# Patient Record
Sex: FEMALE | Race: BLACK OR AFRICAN AMERICAN | NOT HISPANIC OR LATINO | Employment: OTHER | URBAN - METROPOLITAN AREA
[De-identification: names, ages, dates, MRNs, and addresses within clinical notes are randomized per-mention and may not be internally consistent; named-entity substitution may affect disease eponyms.]

---

## 2024-06-25 ENCOUNTER — HOSPITAL ENCOUNTER (EMERGENCY)
Facility: HOSPITAL | Age: 84
Discharge: HOME | End: 2024-06-26
Attending: EMERGENCY MEDICINE
Payer: MEDICARE

## 2024-06-25 ENCOUNTER — APPOINTMENT (OUTPATIENT)
Dept: RADIOLOGY | Facility: HOSPITAL | Age: 84
End: 2024-06-25
Payer: MEDICARE

## 2024-06-25 DIAGNOSIS — M79.10 MUSCLE PAIN: Primary | ICD-10-CM

## 2024-06-25 PROCEDURE — 93971 EXTREMITY STUDY: CPT | Performed by: RADIOLOGY

## 2024-06-25 PROCEDURE — 99284 EMERGENCY DEPT VISIT MOD MDM: CPT | Mod: 25

## 2024-06-25 PROCEDURE — 93971 EXTREMITY STUDY: CPT

## 2024-06-25 PROCEDURE — 2500000005 HC RX 250 GENERAL PHARMACY W/O HCPCS: Mod: SE | Performed by: STUDENT IN AN ORGANIZED HEALTH CARE EDUCATION/TRAINING PROGRAM

## 2024-06-25 RX ORDER — LIDOCAINE 560 MG/1
1 PATCH PERCUTANEOUS; TOPICAL; TRANSDERMAL ONCE
Status: DISCONTINUED | OUTPATIENT
Start: 2024-06-25 | End: 2024-06-26 | Stop reason: HOSPADM

## 2024-06-25 ASSESSMENT — PAIN - FUNCTIONAL ASSESSMENT
PAIN_FUNCTIONAL_ASSESSMENT: 0-10

## 2024-06-25 ASSESSMENT — LIFESTYLE VARIABLES
HAVE YOU EVER FELT YOU SHOULD CUT DOWN ON YOUR DRINKING: NO
EVER FELT BAD OR GUILTY ABOUT YOUR DRINKING: NO
HAVE PEOPLE ANNOYED YOU BY CRITICIZING YOUR DRINKING: NO
EVER HAD A DRINK FIRST THING IN THE MORNING TO STEADY YOUR NERVES TO GET RID OF A HANGOVER: NO
TOTAL SCORE: 0

## 2024-06-25 ASSESSMENT — PAIN DESCRIPTION - LOCATION
LOCATION: LEG
LOCATION: HEAD

## 2024-06-25 ASSESSMENT — PAIN SCALES - GENERAL
PAINLEVEL_OUTOF10: 7
PAINLEVEL_OUTOF10: 8
PAINLEVEL_OUTOF10: 9
PAINLEVEL_OUTOF10: 7

## 2024-06-25 ASSESSMENT — COLUMBIA-SUICIDE SEVERITY RATING SCALE - C-SSRS
2. HAVE YOU ACTUALLY HAD ANY THOUGHTS OF KILLING YOURSELF?: NO
1. IN THE PAST MONTH, HAVE YOU WISHED YOU WERE DEAD OR WISHED YOU COULD GO TO SLEEP AND NOT WAKE UP?: NO
6. HAVE YOU EVER DONE ANYTHING, STARTED TO DO ANYTHING, OR PREPARED TO DO ANYTHING TO END YOUR LIFE?: NO

## 2024-06-25 ASSESSMENT — PAIN DESCRIPTION - DESCRIPTORS: DESCRIPTORS: ACHING

## 2024-06-25 ASSESSMENT — PAIN DESCRIPTION - PAIN TYPE: TYPE: ACUTE PAIN

## 2024-06-25 NOTE — ED TRIAGE NOTES
Pt c/o fall 2 weeks ago (tripped and fell) onto concrete onto her L side (did not seek medical attention). Endorses upper thigh pain that has gotten progressively worse. -LOC, -Thinners.

## 2024-06-26 ENCOUNTER — APPOINTMENT (OUTPATIENT)
Dept: RADIOLOGY | Facility: HOSPITAL | Age: 84
End: 2024-06-26
Payer: MEDICARE

## 2024-06-26 VITALS
RESPIRATION RATE: 16 BRPM | WEIGHT: 160 LBS | DIASTOLIC BLOOD PRESSURE: 82 MMHG | BODY MASS INDEX: 27.31 KG/M2 | HEART RATE: 80 BPM | HEIGHT: 64 IN | TEMPERATURE: 97.9 F | OXYGEN SATURATION: 97 % | SYSTOLIC BLOOD PRESSURE: 189 MMHG

## 2024-06-26 PROCEDURE — 73552 X-RAY EXAM OF FEMUR 2/>: CPT | Mod: LT

## 2024-06-26 PROCEDURE — 2500000001 HC RX 250 WO HCPCS SELF ADMINISTERED DRUGS (ALT 637 FOR MEDICARE OP): Mod: SE | Performed by: STUDENT IN AN ORGANIZED HEALTH CARE EDUCATION/TRAINING PROGRAM

## 2024-06-26 PROCEDURE — 73552 X-RAY EXAM OF FEMUR 2/>: CPT | Mod: LEFT SIDE | Performed by: RADIOLOGY

## 2024-06-26 PROCEDURE — 2500000002 HC RX 250 W HCPCS SELF ADMINISTERED DRUGS (ALT 637 FOR MEDICARE OP, ALT 636 FOR OP/ED): Mod: SE | Performed by: STUDENT IN AN ORGANIZED HEALTH CARE EDUCATION/TRAINING PROGRAM

## 2024-06-26 RX ORDER — NIFEDIPINE 30 MG/1
90 TABLET, FILM COATED, EXTENDED RELEASE ORAL ONCE
Status: COMPLETED | OUTPATIENT
Start: 2024-06-26 | End: 2024-06-26

## 2024-06-26 RX ORDER — METOPROLOL TARTRATE 50 MG/1
100 TABLET ORAL ONCE
Status: COMPLETED | OUTPATIENT
Start: 2024-06-26 | End: 2024-06-26

## 2024-06-26 RX ORDER — IBUPROFEN 400 MG/1
400 TABLET ORAL ONCE
Status: COMPLETED | OUTPATIENT
Start: 2024-06-26 | End: 2024-06-26

## 2024-06-26 RX ORDER — ACETAMINOPHEN 325 MG/1
650 TABLET ORAL ONCE
Status: COMPLETED | OUTPATIENT
Start: 2024-06-26 | End: 2024-06-26

## 2024-06-26 ASSESSMENT — PAIN SCALES - GENERAL
PAINLEVEL_OUTOF10: 8
PAINLEVEL_OUTOF10: 7

## 2024-06-26 ASSESSMENT — PAIN DESCRIPTION - PROGRESSION: CLINICAL_PROGRESSION: GRADUALLY IMPROVING

## 2024-06-26 ASSESSMENT — PAIN DESCRIPTION - FREQUENCY: FREQUENCY: CONSTANT/CONTINUOUS

## 2024-06-26 ASSESSMENT — PAIN DESCRIPTION - DESCRIPTORS: DESCRIPTORS: ACHING

## 2024-06-26 ASSESSMENT — PAIN - FUNCTIONAL ASSESSMENT: PAIN_FUNCTIONAL_ASSESSMENT: 0-10

## 2024-06-26 NOTE — ED PROVIDER NOTES
HPI  83-year-old female who presents after a fall a few weeks ago.  She fell and landed on her left side specifically left lower extremity.  Has reported pain in the left leg since then.  Denies any numbness or weakness.  Denies any head trauma or LOC.  Not on any blood thinners.  Presents today for worsening pain.    Physical Exam  VITALS: Vital signs reviewed in nursing triage note, EMR flow sheets, and at patient's bedside  CONSTITUTIONAL: Well-appearing, in no apparent distress  HEAD: NCAT  EYES: PERRL, EOMI  NECK: Supple, non-tender, full ROM  CARD: RRR, no m/r/g, no JVD  RESP: LCTAB, speaking full sentences, no increased work of breathing, no use of accessory respiratory muscles  BACK: No vertebral point or CVA tenderness, no obvious bony deformities, no spinal step-offs   EXT: pain with ROM in LLE, no edema or deformity  SKIN: Dry with appropriate turgor, no apparent rashes, suspicious lesions, or masses   NEURO: CNs II-XII grossly intact, AAOx4, sensation intact bilaterally, strength 5/5 on UEs and LEs bilaterally, speech is fluent and comprehensible  PSYCH: Appropriate mood and affect, no HI/SI, not responding to internal stimuli    Vitals:    06/25/24 2046   BP: (!) 222/111   Pulse: 87   Resp: 16   Temp: 36.3 °C (97.3 °F)   SpO2: 96%       Assessment/Plan/MDM  83-year-old female who presents with pain in her left lower extremity after a fall 2 weeks ago.  On exam patient is neurovascularly intact.  No focal numbness or weakness to suggest TIA or stroke.  Imaging was obtained including x-ray and venous duplex, these were both negative for any acute findings.  Attempted to ambulate the patient after pain control, patient was able to ambulate well.  She is also found to be very hypertensive, is on multiple hypertensive medications.  was provided her HTN meds here since she had missed doses.  Patient and family were agreeable to discharge plan.      ED Course as of 07/04/24 1333   Wed Jun 26, 2024 0047 Lower  extremity venous duplex left  negative [SB]   0258 X-ray shows arthritis.  Patient was informed of her results, she remains comfortable appearing.  Denies any history of kidney pathology.  Was given 400 mg of ibuprofen.  I did discuss importance of outpatient follow-up with primary care, rest, ice, elevation with concern for muscle strain.  Additionally discussed return precautions.  Patient and family member at bedside endorsed understanding. [SB]      ED Course User Index  [SB] Manuel Rao DO         Diagnoses as of 07/04/24 1333   Muscle pain                 Chivo Beauchamp MD MPH  07/04/24 1333

## 2024-06-26 NOTE — DISCHARGE INSTRUCTIONS
As we discussed will be very important to follow-up with your primary care doctor.  You should return to the ER for any worsening or persistent symptoms.  You may take Tylenol and ibuprofen as needed for pain control.  When you are taking ibuprofen, you should take 400 mg, make sure you are eating something before taking this medication.  You can take this up to every 6 hours.  You may alternate ibuprofen with Tylenol, up to 1000 mg, every 6 hours.  For example, you may take Tylenol at hour 0, ibuprofen at hour 3, Tylenol again at hour 6.  You should not take ibuprofen scheduled for more than 3 days in a row as this may harm your kidneys.  Please make sure that you see your primary care doctor as we discussed.

## 2024-07-20 ENCOUNTER — APPOINTMENT (OUTPATIENT)
Dept: RADIOLOGY | Facility: HOSPITAL | Age: 84
End: 2024-07-20
Payer: MEDICARE

## 2024-07-20 ENCOUNTER — HOSPITAL ENCOUNTER (EMERGENCY)
Facility: HOSPITAL | Age: 84
Discharge: HOME | End: 2024-07-20
Attending: EMERGENCY MEDICINE
Payer: MEDICARE

## 2024-07-20 VITALS
WEIGHT: 160 LBS | SYSTOLIC BLOOD PRESSURE: 185 MMHG | HEART RATE: 90 BPM | RESPIRATION RATE: 17 BRPM | TEMPERATURE: 97.7 F | HEIGHT: 64 IN | OXYGEN SATURATION: 97 % | BODY MASS INDEX: 27.31 KG/M2 | DIASTOLIC BLOOD PRESSURE: 98 MMHG

## 2024-07-20 DIAGNOSIS — M25.552 LEFT HIP PAIN: Primary | ICD-10-CM

## 2024-07-20 LAB
APPEARANCE UR: CLEAR
BILIRUB UR STRIP.AUTO-MCNC: NEGATIVE MG/DL
COLOR UR: NORMAL
GLUCOSE UR STRIP.AUTO-MCNC: NORMAL MG/DL
KETONES UR STRIP.AUTO-MCNC: NEGATIVE MG/DL
LEUKOCYTE ESTERASE UR QL STRIP.AUTO: NEGATIVE
NITRITE UR QL STRIP.AUTO: NEGATIVE
PH UR STRIP.AUTO: 6 [PH]
PROT UR STRIP.AUTO-MCNC: NEGATIVE MG/DL
RBC # UR STRIP.AUTO: NEGATIVE /UL
SP GR UR STRIP.AUTO: 1.02
UROBILINOGEN UR STRIP.AUTO-MCNC: NORMAL MG/DL

## 2024-07-20 PROCEDURE — 2500000001 HC RX 250 WO HCPCS SELF ADMINISTERED DRUGS (ALT 637 FOR MEDICARE OP)

## 2024-07-20 PROCEDURE — 81003 URINALYSIS AUTO W/O SCOPE: CPT | Performed by: PHYSICIAN ASSISTANT

## 2024-07-20 PROCEDURE — 72192 CT PELVIS W/O DYE: CPT

## 2024-07-20 PROCEDURE — 96372 THER/PROPH/DIAG INJ SC/IM: CPT

## 2024-07-20 PROCEDURE — 99284 EMERGENCY DEPT VISIT MOD MDM: CPT | Mod: 25

## 2024-07-20 PROCEDURE — 2500000004 HC RX 250 GENERAL PHARMACY W/ HCPCS (ALT 636 FOR OP/ED)

## 2024-07-20 PROCEDURE — 72192 CT PELVIS W/O DYE: CPT | Performed by: RADIOLOGY

## 2024-07-20 PROCEDURE — 2500000005 HC RX 250 GENERAL PHARMACY W/O HCPCS

## 2024-07-20 RX ORDER — ACETAMINOPHEN 325 MG/1
650 TABLET ORAL EVERY 6 HOURS PRN
Qty: 80 TABLET | Refills: 0 | Status: SHIPPED | OUTPATIENT
Start: 2024-07-20 | End: 2024-07-30

## 2024-07-20 RX ORDER — ORPHENADRINE CITRATE 30 MG/ML
60 INJECTION INTRAMUSCULAR; INTRAVENOUS ONCE
Status: COMPLETED | OUTPATIENT
Start: 2024-07-20 | End: 2024-07-20

## 2024-07-20 RX ORDER — OXYCODONE HYDROCHLORIDE 5 MG/1
5 TABLET ORAL ONCE
Status: COMPLETED | OUTPATIENT
Start: 2024-07-20 | End: 2024-07-20

## 2024-07-20 RX ORDER — KETOROLAC TROMETHAMINE 30 MG/ML
30 INJECTION, SOLUTION INTRAMUSCULAR; INTRAVENOUS ONCE
Status: COMPLETED | OUTPATIENT
Start: 2024-07-20 | End: 2024-07-20

## 2024-07-20 RX ORDER — LIDOCAINE 560 MG/1
1 PATCH PERCUTANEOUS; TOPICAL; TRANSDERMAL DAILY
Status: DISCONTINUED | OUTPATIENT
Start: 2024-07-20 | End: 2024-07-21 | Stop reason: HOSPADM

## 2024-07-20 RX ORDER — CYCLOBENZAPRINE HCL 5 MG
5 TABLET ORAL EVERY 8 HOURS
Qty: 30 TABLET | Refills: 0 | Status: SHIPPED | OUTPATIENT
Start: 2024-07-20 | End: 2024-07-30

## 2024-07-20 RX ADMIN — ORPHENADRINE CITRATE 60 MG: 60 INJECTION INTRAMUSCULAR; INTRAVENOUS at 19:31

## 2024-07-20 RX ADMIN — KETOROLAC TROMETHAMINE 30 MG: 30 INJECTION, SOLUTION INTRAMUSCULAR at 19:31

## 2024-07-20 RX ADMIN — OXYCODONE HYDROCHLORIDE 5 MG: 5 TABLET ORAL at 19:57

## 2024-07-20 RX ADMIN — LIDOCAINE 4% 1 PATCH: 40 PATCH TOPICAL at 19:31

## 2024-07-20 ASSESSMENT — PAIN DESCRIPTION - ONSET: ONSET: GRADUAL

## 2024-07-20 ASSESSMENT — PAIN SCALES - GENERAL
PAINLEVEL_OUTOF10: 8
PAINLEVEL_OUTOF10: 0 - NO PAIN

## 2024-07-20 ASSESSMENT — PAIN DESCRIPTION - ORIENTATION: ORIENTATION: RIGHT

## 2024-07-20 ASSESSMENT — COLUMBIA-SUICIDE SEVERITY RATING SCALE - C-SSRS
1. IN THE PAST MONTH, HAVE YOU WISHED YOU WERE DEAD OR WISHED YOU COULD GO TO SLEEP AND NOT WAKE UP?: NO
6. HAVE YOU EVER DONE ANYTHING, STARTED TO DO ANYTHING, OR PREPARED TO DO ANYTHING TO END YOUR LIFE?: NO
2. HAVE YOU ACTUALLY HAD ANY THOUGHTS OF KILLING YOURSELF?: NO

## 2024-07-20 ASSESSMENT — PAIN - FUNCTIONAL ASSESSMENT: PAIN_FUNCTIONAL_ASSESSMENT: 0-10

## 2024-07-20 ASSESSMENT — PAIN DESCRIPTION - LOCATION: LOCATION: HIP

## 2024-07-20 ASSESSMENT — PAIN DESCRIPTION - DIRECTION: RADIATING_TOWARDS: RIGHT LEG

## 2024-07-20 ASSESSMENT — PAIN DESCRIPTION - PAIN TYPE: TYPE: OTHER (COMMENT)

## 2024-07-20 ASSESSMENT — PAIN DESCRIPTION - FREQUENCY: FREQUENCY: CONSTANT/CONTINUOUS

## 2024-07-20 ASSESSMENT — PAIN DESCRIPTION - PROGRESSION: CLINICAL_PROGRESSION: NOT CHANGED

## 2024-07-20 NOTE — ED PROVIDER NOTES
CC: Hip Pain (Right side)     History provided by: Patient  Limitations to History: None    HPI:  Patient is a 83-year-old female with a PMH of remote fall with subsequent chronic left hip pain.  Patient reports left hip pain with sciatica since the fall.  The patient reports her pain is exacerbated today unrelieved by at home remedies of muscle relaxer and Tylenol.  Patient reports no new symptoms and these are just increased as of recently.  The patient denies leg weakness, fevers, chills, loss of bowel or bladder function, or saddle anesthesia.    External Records Reviewed: Previous ED records, inpatient records, and outpatient records  ???????????????????????????????????????????????????????????????  Triage Vitals:  T 36.5 °C (97.7 °F)  HR (!) 103  /84  RR 18  O2 100 % None (Room air)    Physical Exam  Constitutional:       General: She is awake. She is not in acute distress.     Appearance: She is not ill-appearing, toxic-appearing or diaphoretic.   HENT:      Head: Atraumatic.   Eyes:      Extraocular Movements: Extraocular movements intact.      Conjunctiva/sclera: Conjunctivae normal.   Cardiovascular:      Rate and Rhythm: Normal rate and regular rhythm.      Pulses:           Radial pulses are 2+ on the left side.        Dorsalis pedis pulses are 2+ on the right side and 2+ on the left side.      Heart sounds: Normal heart sounds, S1 normal and S2 normal. Heart sounds not distant. No murmur heard.     No friction rub.   Pulmonary:      Effort: Pulmonary effort is normal. No respiratory distress.      Breath sounds: Normal breath sounds.      Comments: Lungs are clear to auscultation without evidence of wheezing, crackles, rhonchi.  Abdominal:      General: Abdomen is protuberant. There is no distension.      Palpations: Abdomen is soft.      Tenderness: There is no abdominal tenderness. There is no right CVA tenderness, left CVA tenderness, guarding or rebound.   Musculoskeletal:      Right lower  leg: No edema.      Left lower leg: No edema.      Comments: 5/5 strength in left hip flexion and extension.  5/5 strength in left leg extension and flexion.  There are 2+ DP and PT pulses in the left lower extremity.  There is no obvious edema of the left lower extremity or calf tenderness.  There is piriformis point tenderness of the left buttocks.   Neurological:      Mental Status: She is alert.   Psychiatric:         Behavior: Behavior is cooperative.        ???????????????????????????????????????????????????????????????  ED Course/Treatment/Medical Decision Making      Independent Interpretation of Studies:  I independently interpreted: CT pelvis without IV contrast    Differential diagnoses considered include but ar not limited to: Sciatica, SI joint pathology, lumbar pathology, DVT    Social Determinants Limiting Care:  None identified         ED Course:  Diagnoses as of 07/20/24 1948   Left hip pain       MDM:    Patient is a 83-year-old female with above PMH who presents to the emergency department for left hip pain.  Upon arrival patient's vital signs remarkable for hypertension, she is nontoxic-appearing, and appears no acute distress.  Upon chart review the patient had a fall several months ago and has had chronic left hip pain since that time.  Of note the patient had a recent DVT ultrasound left lower extremity which is negative.  Low concern for DVT today given physical examination.  Patient's pain appears to be musculoskeletal in nature.  The patient will be given Norflex, Toradol, and oxycodone for pain relief.  CT of the pelvis is without acute infection, fracture, or dislocation.  Upon reexamination the patient's pain has improved.  She will be discharged home with Rx for Tylenol and Flexeril and orthopedic surgery follow-up.    Impression:  Left hip pain    Disposition:  Discharge home    Patient staffed and discussed with ED attending Dr. Dorene Frazier, DO   Emergency  Medicine, PGY-2      Procedures ? [employer] last updated 7/20/2024 7:05 PM          Gray Frazier DO  Resident  07/23/24 8632

## 2024-07-20 NOTE — ED TRIAGE NOTES
Pt came in for having a fall a couple of days ago. Pt takes a muscle relaxant and oxycodone and pt still feels pain in her right hip and leg.

## 2024-07-22 ENCOUNTER — OFFICE VISIT (OUTPATIENT)
Dept: ORTHOPEDIC SURGERY | Facility: CLINIC | Age: 84
End: 2024-07-22
Payer: MEDICARE

## 2024-07-22 DIAGNOSIS — M54.32 SCIATICA OF LEFT SIDE: ICD-10-CM

## 2024-07-22 DIAGNOSIS — M25.552 LEFT HIP PAIN: ICD-10-CM

## 2024-07-22 PROCEDURE — 1160F RVW MEDS BY RX/DR IN RCRD: CPT | Performed by: ORTHOPAEDIC SURGERY

## 2024-07-22 PROCEDURE — 1125F AMNT PAIN NOTED PAIN PRSNT: CPT | Performed by: ORTHOPAEDIC SURGERY

## 2024-07-22 PROCEDURE — 1159F MED LIST DOCD IN RCRD: CPT | Performed by: ORTHOPAEDIC SURGERY

## 2024-07-22 PROCEDURE — 99214 OFFICE O/P EST MOD 30 MIN: CPT | Performed by: ORTHOPAEDIC SURGERY

## 2024-07-22 PROCEDURE — 99204 OFFICE O/P NEW MOD 45 MIN: CPT | Performed by: ORTHOPAEDIC SURGERY

## 2024-07-22 RX ORDER — TRAMADOL HYDROCHLORIDE 50 MG/1
50 TABLET ORAL EVERY 8 HOURS PRN
Qty: 28 TABLET | Refills: 0 | Status: SHIPPED | OUTPATIENT
Start: 2024-07-22

## 2024-07-22 RX ORDER — METHYLPREDNISOLONE 4 MG/1
TABLET ORAL
Qty: 21 TABLET | Refills: 0 | Status: SHIPPED | OUTPATIENT
Start: 2024-07-22 | End: 2024-07-29

## 2024-07-22 ASSESSMENT — PAIN DESCRIPTION - DESCRIPTORS: DESCRIPTORS: SHOOTING;ACHING

## 2024-07-22 ASSESSMENT — PAIN SCALES - GENERAL: PAINLEVEL_OUTOF10: 8

## 2024-07-22 ASSESSMENT — PAIN - FUNCTIONAL ASSESSMENT: PAIN_FUNCTIONAL_ASSESSMENT: 0-10

## 2024-07-22 NOTE — PROGRESS NOTES
2-week history of left buttock pain.  No injury  Went to the ER where she had a workup including CT scan and plain x-rays pain does not radiate below the buttock area is worse with activity but hurts when she sits as well there is no groin pain   No weakness no bowel or bladder dysfunction   Past medical,family and social histories have been reviewed and are up to date.    Examination: Awake alert oriented appropriate  She has no hip pain with Tatian she is tender in the sciatic notch area ischial tuberosity hurts with a straight leg raise    2 weeks sciatica  Ct/xray neg  Diabetic  Mdp pt ultram

## 2024-08-08 DIAGNOSIS — M54.32 SCIATICA OF LEFT SIDE: ICD-10-CM

## 2024-08-09 DIAGNOSIS — M54.32 SCIATICA OF LEFT SIDE: Primary | ICD-10-CM

## 2024-08-09 RX ORDER — TRAMADOL HYDROCHLORIDE 50 MG/1
50 TABLET ORAL EVERY 8 HOURS PRN
Qty: 10 TABLET | Refills: 0 | Status: SHIPPED | OUTPATIENT
Start: 2024-08-09 | End: 2024-08-14

## 2024-08-19 ENCOUNTER — EVALUATION (OUTPATIENT)
Dept: PHYSICAL THERAPY | Facility: CLINIC | Age: 84
End: 2024-08-19
Payer: MEDICARE

## 2024-08-19 DIAGNOSIS — M54.32 SCIATICA OF LEFT SIDE: ICD-10-CM

## 2024-08-19 DIAGNOSIS — M25.552 LEFT HIP PAIN: ICD-10-CM

## 2024-08-19 PROCEDURE — 97110 THERAPEUTIC EXERCISES: CPT | Mod: GP | Performed by: PHYSICAL THERAPIST

## 2024-08-19 PROCEDURE — 97161 PT EVAL LOW COMPLEX 20 MIN: CPT | Mod: GP | Performed by: PHYSICAL THERAPIST

## 2024-08-19 ASSESSMENT — ENCOUNTER SYMPTOMS
DEPRESSION: 0
LOSS OF SENSATION IN FEET: 1
OCCASIONAL FEELINGS OF UNSTEADINESS: 1

## 2024-08-19 NOTE — PROGRESS NOTES
Physical Therapy    Physical Therapy Evaluation and Treatment      Patient Name: Rashmi Collins  MRN: [id number]  Today's Date: 10/1/2024  Visit: 1  Insurance: Reviewed  Physician: Marko Rodriguez  Problem List Items Addressed This Visit    None  Visit Diagnoses         Codes    Left hip pain     M25.552    Relevant Orders    Follow Up In Physical Therapy    Sciatica of left side     M54.32    Relevant Orders    Follow Up In Physical Therapy               Time Entry:   Time Calculation  Start Time: 0900  Stop Time: 0935  Time Calculation (min): 35 min  PT Evaluation Time Entry  PT Evaluation (Low) Time Entry: 25  PT Therapeutic Procedures Time Entry  Therapeutic Exercise Time Entry: 10    Assessment: Patient seen in PT for Initial Evaluation for left sided hip and sciatic pain.   Patient presents with postural deviation  decreased lumbar ROM , decreased core and left leg strength, sciatic notch tenderness, positive SLR left.  Functionally, patient  unable to do strenuous ADL's, limited ambulation.  Patient rates LEFS at 18.75%.          Plan:  Continue with POC  SciFit stepper, back flexion exercises, gentle back stretches, Leg strength, PRE's, functional activities  OP PT Plan  PT Plan: Skilled PT  PT Frequency: 1 time per week  Duration: 6  Onset Date: 07/22/24  Certification Period Start Date: 08/19/24  Certification Period End Date: 11/17/24  Rehab Potential: Good  Plan of Care Agreement: Patient    Current Problem:   1. Left hip pain  Referral to Physical Therapy    Follow Up In Physical Therapy      2. Sciatica of left side  Referral to Physical Therapy    Follow Up In Physical Therapy          Subjective    General: Patient with a history of sciatic pain in left side for 2 months.  Onset was after fall - hit hip after tripping.   Patient treated with oxycodone/tramadol  - no help..  Xrays - CT negative for fx.  Patient complains of pain in the region of the left LS junction down PL leg, throb pain, 10/10 at  worst last 7 days.  Patient's pain is worse with sit and standing, when she goes to walk. Hx of knee oa.   Functionally, patient is unable to doing only light ADL's.  Xrays after fall.       Precautions: DM, HTN, cardiac, fall risk        Prior Level of Function: no limits       Objective     Posture: lateral shift to the left, bilateral knee varus - right > left  Lumbar ROM: 40 flexion, 15 ext, left SB 15, right SB 0  left  hip ROM to 120 flexion, 0 extension, 40 ER, 20 IR,  left Hip strength to 4/5 flexion, 4/5 extension,  4/5 abd, left quad 4/5  Right hip strength to 5/5  Quad atrophy on left  Tender to palpation at the left sciatic notch   Sensation intact to light touch  Special Tests: positive SLR on left    Outcome Measures:        Treatments:  Patient instructed in HEP including: ball squeezes, hip abd/er with green theraband, SAQ, SLR and quad set 10-20 times each (10 minutes)      EDUCATION: HEP       Goals:  Active       PT Problem       PT Goal 1       Start:  08/19/24    Expected End:  11/17/24       Back and leg pain 5/10 or less         PT Goal 2       Start:  08/19/24    Expected End:  11/17/24       Improve  LEFS by 20%         PT Goal 3       Start:  08/19/24    Expected End:  11/17/24       Maximize lumbar ROM          PT Goal 4       Start:  08/19/24    Expected End:  11/17/24       Abdominal strength to fair   Left leg strength 5/5

## 2024-08-27 RX ORDER — TRAMADOL HYDROCHLORIDE 50 MG/1
50 TABLET ORAL EVERY 8 HOURS PRN
Qty: 28 TABLET | Refills: 0 | OUTPATIENT
Start: 2024-08-27

## 2024-09-03 ENCOUNTER — APPOINTMENT (OUTPATIENT)
Dept: PHYSICAL THERAPY | Facility: CLINIC | Age: 84
End: 2024-09-03
Payer: MEDICARE

## 2024-09-10 ENCOUNTER — APPOINTMENT (OUTPATIENT)
Dept: PHYSICAL THERAPY | Facility: CLINIC | Age: 84
End: 2024-09-10
Payer: MEDICARE

## 2024-10-15 ENCOUNTER — DOCUMENTATION (OUTPATIENT)
Dept: PHYSICAL THERAPY | Facility: CLINIC | Age: 84
End: 2024-10-15
Payer: MEDICARE

## 2024-10-15 NOTE — PROGRESS NOTES
Physical Therapy    Discharge Summary    Name: Rashmi Collins  MRN: [id number]  : 1940  Date: 10/15/24    Discharge Summary: PT    Discharge Information: Date of discharge 10/15/24, Date of last visit 24, Date of evaluation 24, Number of attended visits 1, Referred by Jennifer, and Referred for left hip pain and sciatica    Therapy Summary: Patient seen in PT for initial evaluation for left hip pain and sciatica.  Patient did not follow up in PT after initial evaluation.      Discharge Status: Status unknown.     Rehab Discharge Reason: Failed to schedule and/or keep follow-up appointment(s)